# Patient Record
Sex: FEMALE | Race: AMERICAN INDIAN OR ALASKA NATIVE
[De-identification: names, ages, dates, MRNs, and addresses within clinical notes are randomized per-mention and may not be internally consistent; named-entity substitution may affect disease eponyms.]

---

## 2018-10-05 ENCOUNTER — HOSPITAL ENCOUNTER (INPATIENT)
Dept: HOSPITAL 5 - TRG | Age: 23
LOS: 3 days | Discharge: HOME | End: 2018-10-08
Attending: OBSTETRICS & GYNECOLOGY | Admitting: OBSTETRICS & GYNECOLOGY
Payer: MEDICAID

## 2018-10-05 DIAGNOSIS — E66.01: ICD-10-CM

## 2018-10-05 DIAGNOSIS — Z3A.39: ICD-10-CM

## 2018-10-05 DIAGNOSIS — D64.9: ICD-10-CM

## 2018-10-05 DIAGNOSIS — Z79.899: ICD-10-CM

## 2018-10-05 LAB
ALBUMIN SERPL-MCNC: 3.3 G/DL (ref 3.9–5)
ALT SERPL-CCNC: 18 UNITS/L (ref 7–56)
BILIRUB UR QL STRIP: (no result)
BLOOD UR QL VISUAL: (no result)
BUN SERPL-MCNC: 6 MG/DL (ref 7–17)
BUN/CREAT SERPL: 15 %
CALCIUM SERPL-MCNC: 8.8 MG/DL (ref 8.4–10.2)
HCT VFR BLD CALC: 32.2 % (ref 30.3–42.9)
HEMOLYSIS INDEX: 10
HGB BLD-MCNC: 10.8 GM/DL (ref 10.1–14.3)
MCH RBC QN AUTO: 26 PG (ref 28–32)
MCHC RBC AUTO-ENTMCNC: 34 % (ref 30–34)
MCV RBC AUTO: 77 FL (ref 79–97)
MUCOUS THREADS #/AREA URNS HPF: (no result) /HPF
PH UR STRIP: 6 [PH] (ref 5–7)
PLATELET # BLD: 314 K/MM3 (ref 140–440)
RBC # BLD AUTO: 4.16 M/MM3 (ref 3.65–5.03)
RBC #/AREA URNS HPF: 4 /HPF (ref 0–6)
UROBILINOGEN UR-MCNC: < 2 MG/DL (ref ?–2)
WBC #/AREA URNS HPF: 8 /HPF (ref 0–6)

## 2018-10-05 PROCEDURE — 85014 HEMATOCRIT: CPT

## 2018-10-05 PROCEDURE — 90715 TDAP VACCINE 7 YRS/> IM: CPT

## 2018-10-05 PROCEDURE — 86592 SYPHILIS TEST NON-TREP QUAL: CPT

## 2018-10-05 PROCEDURE — A6250 SKIN SEAL PROTECT MOISTURIZR: HCPCS

## 2018-10-05 PROCEDURE — 80053 COMPREHEN METABOLIC PANEL: CPT

## 2018-10-05 PROCEDURE — 76815 OB US LIMITED FETUS(S): CPT

## 2018-10-05 PROCEDURE — 36415 COLL VENOUS BLD VENIPUNCTURE: CPT

## 2018-10-05 PROCEDURE — 87086 URINE CULTURE/COLONY COUNT: CPT

## 2018-10-05 PROCEDURE — 81001 URINALYSIS AUTO W/SCOPE: CPT

## 2018-10-05 PROCEDURE — 86901 BLOOD TYPING SEROLOGIC RH(D): CPT

## 2018-10-05 PROCEDURE — 76819 FETAL BIOPHYS PROFIL W/O NST: CPT

## 2018-10-05 PROCEDURE — 85027 COMPLETE CBC AUTOMATED: CPT

## 2018-10-05 PROCEDURE — 86900 BLOOD TYPING SEROLOGIC ABO: CPT

## 2018-10-05 PROCEDURE — 90471 IMMUNIZATION ADMIN: CPT

## 2018-10-05 PROCEDURE — 86850 RBC ANTIBODY SCREEN: CPT

## 2018-10-05 PROCEDURE — 59200 INSERT CERVICAL DILATOR: CPT

## 2018-10-05 PROCEDURE — 85018 HEMOGLOBIN: CPT

## 2018-10-05 RX ADMIN — SODIUM CHLORIDE, SODIUM LACTATE, POTASSIUM CHLORIDE, AND CALCIUM CHLORIDE SCH MLS/HR: .6; .31; .03; .02 INJECTION, SOLUTION INTRAVENOUS at 20:28

## 2018-10-05 NOTE — ULTRASOUND REPORT
FINAL REPORT



PROCEDURE:  Limited obstetrical ultrasound. 



TECHNIQUE:  Real-time limited sonographic examination was

performed for evaluation of fetal size, position, heartbeat,

fluid volume  for each fetus with image documentation (1 or more

fetuses). CPT 08256



HISTORY:  Pregnancy, evaluate amniotic fluid index. 



COMPARISON:  No prior studies are available for comparison.



FINDINGS:  

There is a single viable fetus. Cardiac activity is demonstrated

at 135 beats per minute. The amniotic fluid index measures 8.6

centimeters. 



IMPRESSION:  

8.6 centimeter amniotic fluid index.

## 2018-10-05 NOTE — ULTRASOUND REPORT
FINAL REPORT



PROCEDURE:  Ultrasound fetal biophysical profile without

nonstress test. 



TECHNIQUE:  Sonographic evaluation for fetal breathing, fetal

movement, fetal tone, and amniotic fluid volume was performed.

CPT 43347



HISTORY:  Pregnancy, evaluate biophysical profile. 



COMPARISON:  No prior studies are available for comparison.



FINDINGS:  

Amniotic fluid volume: 2.



Fetal breathin.



Fetal movement: 2.



Fetal tone: 2.



Score: 8 of 8.



IMPRESSION:  

Normal biophysical profile.

## 2018-10-05 NOTE — HISTORY AND PHYSICAL REPORT
History of Present Illness


Date of examination: 10/05/18


Date of admission: 


10/05/18 19:26





Chief complaint: 


Here for induction of labor due to class 3 obesity.





History of present illness: 


23 year old  presents to L&D for induction of labor due to obesity. 


Patient has received prenatal care at [facility] and co-

managed with [facility].  


LMP 18. EDC 10/11/18 (based on LMP and confirmed by 12 week US). EGA 39 

weeks, 1 day gestation. 


Pregnancy significant for obesity, UTI (treated with Macrobid), Vitamin D 

deficiency (supplemented with Vitamin D), and marginal cord insertion.


Prenatal labs are as follows: A+, antibody screen negative, rubella immune, RPR 

nonreactive, hepatitis B surface antigen negative, HIV negative, hemoglobin 

electrophoresis normal, chlamydia negative, gonorrhea negative, HSV 2 negative, 

GBS negative.








Past History


Past Medical History: other (obesity)


Past Surgical History: other (elective )


GYN History: denies: cancer, chlamydia, fibroids, gonorrhea, hepatitis B, 

hepatitis C, herpes, HIV, syphilis


Family/Genetic History: none


Social history: single, lives with family, full code.  denies: smoking, alcohol 

abuse, prescription drug abuse, IV drug use





- Obstetrical History


Expected Date of Delivery: 10/11/18


Actual Gestation: 39 Week(s) 1 Day(s) 


: 2


Para: 0


Hx # Term Pregnancies: 0


Number of  Pregnancies: 0


Spontaneous Abortions: 0


Induced : 1


Number of Living Children: 0





Medications and Allergies


 Allergies











Allergy/AdvReac Type Severity Reaction Status Date / Time


 


No Known Allergies Allergy   Unverified 10/05/18 16:40











 Home Medications











 Medication  Instructions  Recorded  Confirmed  Last Taken  Type


 


Pnv No.121/Iron/Folic Acid 1 tab PO QDAY 10/05/18 10/05/18 2 Days Ago History





[Prenatal Multivitamin Tablet]    ~10/03/18 











Active Meds: 


Active Medications





Dinoprostone (Cervidil)  10 mg VG ONCE ONE


   Stop: 10/05/18 21:18


Ephedrine Sulfate (Ephedrine Sulfate)  10 mg IV Q2M PRN


   PRN Reason: Hypotension


Lactated Ringer's (Lactated Ringers)  1,000 mls @ 125 mls/hr IV AS DIRECT SAMARA


Oxytocin/Sodium Chloride (Pitocin/Ns 20 Unit/1000ml Drip)  20 units in 1,000 

mls @ 125 mls/hr IV AS DIRECT SAMARA


Lidocaine (Xylocaine 2%)  20 ml INFILTRATI ONCE ONE


   Stop: 10/05/18 20:18


Terbutaline Sulfate (Brethine)  0.25 mg SUB-Q ONCE PRN


   PRN Reason: Hyperstimulation/Hypertonicity











Review of Systems


All systems: negative (induction of labor for obesity)





- Vital Signs


Vital signs: 


 Vital Signs











Temp Pulse Resp BP


 


 97.8 F   82   18   132/76 


 


 10/05/18 17:55  10/05/18 17:55  10/05/18 17:55  10/05/18 17:55








 











Temp Pulse Resp BP Pulse Ox


 


 98.3 F   87   20   137/76   97 


 


 10/05/18 19:23  10/05/18 19:25  10/05/18 19:23  10/05/18 19:24  10/05/18 19:25














- Physical Exam


Cardiovascular: Regular rate, Normal S1, Normal S2


Lungs: Positive: Clear to auscultation


Abdomen: Positive: normal appearance, soft.  Negative: distention, tenderness, 

guarding, rigidity


Genitourinary (Female): Positive: normal external genitalia, normal perenium, 

other (Several small condyloma noted bilaterally on vulva; no ulcers, no 

blisters noted on careful exam with bright light upon admission)


Vagina: Positive: normal moisture


Uterus: Positive: enlarged.  Negative: tender


Anus/Rectum: Positive: normal perianal skin


Extremities: Positive: normal





- Obstetrical


FHR: category 2


Uterine Contraction Monitor Mode: External


Cervical Dilatation: 1


Cervical Effacement Percentage: 70


Fetal station: -2


Uterine Contraction Pattern: Absent





Results


All other labs normal.








Assessment and Plan


A: Pregnancy at 39 weeks, 1 day gestation. 


Obesity. 


GBS negative. 


P: Admit. 


Cervidil cervical ripening, followed by Pitocin induction of labor. 


Discussed with patient risks and benefits of Cervidil cervical ripening and 

Pitocin induction of labor. Patient consented to Cervidil cervical ripening and 

Pitocin induction of labor.

## 2018-10-06 PROCEDURE — 3E0R3BZ INTRODUCTION OF ANESTHETIC AGENT INTO SPINAL CANAL, PERCUTANEOUS APPROACH: ICD-10-PCS | Performed by: OBSTETRICS & GYNECOLOGY

## 2018-10-06 PROCEDURE — 3E033VJ INTRODUCTION OF OTHER HORMONE INTO PERIPHERAL VEIN, PERCUTANEOUS APPROACH: ICD-10-PCS | Performed by: OBSTETRICS & GYNECOLOGY

## 2018-10-06 PROCEDURE — 00HU33Z INSERTION OF INFUSION DEVICE INTO SPINAL CANAL, PERCUTANEOUS APPROACH: ICD-10-PCS | Performed by: OBSTETRICS & GYNECOLOGY

## 2018-10-06 PROCEDURE — 10907ZC DRAINAGE OF AMNIOTIC FLUID, THERAPEUTIC FROM PRODUCTS OF CONCEPTION, VIA NATURAL OR ARTIFICIAL OPENING: ICD-10-PCS | Performed by: OBSTETRICS & GYNECOLOGY

## 2018-10-06 PROCEDURE — 3E0P7VZ INTRODUCTION OF HORMONE INTO FEMALE REPRODUCTIVE, VIA NATURAL OR ARTIFICIAL OPENING: ICD-10-PCS | Performed by: OBSTETRICS & GYNECOLOGY

## 2018-10-06 RX ADMIN — SODIUM CHLORIDE, SODIUM LACTATE, POTASSIUM CHLORIDE, AND CALCIUM CHLORIDE SCH MLS/HR: .6; .31; .03; .02 INJECTION, SOLUTION INTRAVENOUS at 04:08

## 2018-10-06 RX ADMIN — DOCUSATE SODIUM SCH MG: 100 CAPSULE, LIQUID FILLED ORAL at 21:07

## 2018-10-06 RX ADMIN — IBUPROFEN SCH MG: 600 TABLET, FILM COATED ORAL at 21:07

## 2018-10-06 RX ADMIN — SODIUM CHLORIDE, SODIUM LACTATE, POTASSIUM CHLORIDE, AND CALCIUM CHLORIDE SCH MLS/HR: .6; .31; .03; .02 INJECTION, SOLUTION INTRAVENOUS at 11:45

## 2018-10-06 RX ADMIN — SODIUM CHLORIDE, SODIUM LACTATE, POTASSIUM CHLORIDE, AND CALCIUM CHLORIDE SCH MLS/HR: .6; .31; .03; .02 INJECTION, SOLUTION INTRAVENOUS at 13:50

## 2018-10-06 NOTE — EVENT NOTE
Date: 10/06/18


Patient received Cervidil for cervical ripening overnight. Pt. is being induced 

at 39 weeks, 2 days gestation due to morbid obesity. Cervidil has been removed 

and cervix is 3 cm dilated per RN. Pitocin orders put in; plan to start Pitocin 

at 11:30 AM. Continuous EFM.

## 2018-10-06 NOTE — PROCEDURE NOTE
OB Delivery Note





- Delivery


Date of Delivery: 10/06/18


Surgeon: LARA LUNA


Assistant: NORA VICENTE


Estimated blood loss: other (250 cc)





- Vaginal


Delivery presentation: vertex


Delivery position: OA


Intrapartum events: mult.variable deceleratio


Delivery induction: AROM


Delivery monitor: external FHT, external uterine


Route of delivery: vacuum extraction


Indicators for instrumentation: nonreassuring FHR tracing


Delivery placenta: spontaneous


Delivery cord: 3 umbilical vessels


Episiotomy: none


Delivery laceration: none


Anesthesia: epidural


Delivery comments: 


Vacuum assisted vaginal delivery performed by Dr. ABHI Luna of liveborn male 

infant weighing 7 lb. 15 oz. over intact perineum with apgars of 8/9. Patient 

had urge to push but was not pushing effectively. FHR tracing showed deep 

variable FHR decelerations. Dr. Luna came to patient's room to expedite 

delivery with [place]. NICU called to delivery to evaluate baby. Baby placed on 

maternal chest after delivery while 3 vessel cord was double clamped and cut. 

Baby was then taken to radiant warmer to be evaluated by NICU team. Spontaneous 

delivery of intact placenta and membranes by burgess mechanism. Pitocin to IV 

fluids after delivery of placenta.  cc. Fundus firm and midline. No 

lacerations noted. Vaginal sweep negative. Sponge count correct. Sharps removed 

from table.

## 2018-10-07 LAB
HCT VFR BLD CALC: 26.7 % (ref 30.3–42.9)
HGB BLD-MCNC: 9 GM/DL (ref 10.1–14.3)

## 2018-10-07 RX ADMIN — IBUPROFEN SCH MG: 600 TABLET, FILM COATED ORAL at 12:53

## 2018-10-07 RX ADMIN — FERROUS SULFATE TAB 325 MG (65 MG ELEMENTAL FE) SCH MG: 325 (65 FE) TAB at 22:28

## 2018-10-07 RX ADMIN — FERROUS SULFATE TAB 325 MG (65 MG ELEMENTAL FE) SCH MG: 325 (65 FE) TAB at 18:33

## 2018-10-07 RX ADMIN — IBUPROFEN SCH MG: 600 TABLET, FILM COATED ORAL at 05:24

## 2018-10-07 RX ADMIN — IBUPROFEN SCH: 600 TABLET, FILM COATED ORAL at 21:06

## 2018-10-07 RX ADMIN — DOCUSATE SODIUM SCH MG: 100 CAPSULE, LIQUID FILLED ORAL at 22:28

## 2018-10-07 RX ADMIN — DOCUSATE SODIUM SCH MG: 100 CAPSULE, LIQUID FILLED ORAL at 10:43

## 2018-10-07 RX ADMIN — IBUPROFEN SCH MG: 600 TABLET, FILM COATED ORAL at 18:21

## 2018-10-07 NOTE — PROGRESS NOTE
Assessment and Plan


A: Postpartum day 1 S/P VAVD.


Anemia. 


P: Supplement with iron. 











Subjective





- Subjective


Date of service: 10/07/18


Principal diagnosis: Postpartum day 1 S/P VAVD


Interval history: 


Postpartum day 1 S/P VAVD.


Doing well. Patient reports a small amount of lochia.


Patient is voiding without difficulty. she is ambulating well and tolerating a 

regular diet. 


Patient denies headache, cough, shortness of breath, chest pain, abdominal pain

, leg pain, nausea or vomiting, heavy bleeding, or any other problems. 





Patient reports: appetite normal, voiding normally, pain well controlled, flatus

, ambulating normally


: doing well





Objective





- Vital Signs


Latest vital signs: 


 Vital Signs











  Temp Pulse Resp BP BP Pulse Ox


 


 10/07/18 12:53    18   


 


 10/07/18 08:33   86     96


 


 10/07/18 08:32  97.8 F  84  18  125/56   95


 


 10/07/18 06:20    18   


 


 10/07/18 05:24    18   


 


 10/07/18 00:30  98.7 F  69  16   107/72 


 


 10/06/18 22:07    18   


 


 10/06/18 21:07    18   


 


 10/06/18 19:30  98.6 F  72  16   104/78 


 


 10/06/18 19:03  98.2 F  84  20  126/55   100


 


 10/06/18 18:55  98.4 F  82  20   126/56  100


 


 10/06/18 17:46   98 H   131/59  


 


 10/06/18 17:31   93 H   120/56  


 


 10/06/18 17:16   100 H   136/71  


 


 10/06/18 17:12   97 H     96


 


 10/06/18 17:10   93 H     92


 


 10/06/18 17:06   95 H     99


 


 10/06/18 17:01   93 H     98


 


 10/06/18 16:19   89     82 L


 


 10/06/18 16:18   107 H     97


 


 10/06/18 16:13   88     100


 


 10/06/18 16:08   85     100


 


 10/06/18 16:03   85     99


 


 10/06/18 15:58   73     100


 


 10/06/18 15:55   76   127/61  


 


 10/06/18 15:53   78     99


 


 10/06/18 15:52   90     78 L


 


 10/06/18 15:48   80     100


 


 10/06/18 15:43   80     100


 


 10/06/18 15:38   88     98


 


 10/06/18 15:33   89     99


 


 10/06/18 15:28   83     100


 


 10/06/18 15:26   90   109/56  


 


 10/06/18 15:23   97 H     99


 


 10/06/18 15:18   90     100


 


 10/06/18 15:13   89     100


 


 10/06/18 15:10   85   133/60  


 


 10/06/18 15:08   91 H     100


 


 10/06/18 15:03   96 H     100


 


 10/06/18 14:58   98 H     99


 


 10/06/18 14:53   86     100


 


 10/06/18 14:48   106 H   138/62  


 


 10/06/18 14:47   120 H     95


 


 10/06/18 14:46   91 H   135/62  


 


 10/06/18 14:44   95 H     72 L


 


 10/06/18 14:43   84   135/63  


 


 10/06/18 14:41   84     100


 


 10/06/18 14:39   82   131/59  


 


 10/06/18 14:36       57 L


 


 10/06/18 14:35   89     84


 


 10/06/18 14:30   54 L     58 L








 Intake and Output











 10/06/18 10/07/18 10/07/18





 23:59 07:59 15:59


 


Intake Total 740 440 


 


Output Total 500  


 


Balance 240 440 


 


Intake:   


 


  Oral 200 200 


 


  Intake, Free Water 540 240 


 


Output:   


 


  Urine 500  


 


    Void 500  


 


Other:   


 


  Total, Intake Amount 200 200 


 


  Total, Output Amount 500  


 


  # Voids   


 


    Void 1  


 


  Estimated Blood Loss 250  














- Exam


Cardiovascular: Present: Regular rate, Normal S1, Normal S2


Lungs: Present: Clear to auscultation


Abdomen: Present: normal appearance, soft, normal bowel sounds.  Absent: 

distention, tenderness, guarding, rigidity


Uterus: Present: normal, firm, fundal height below umbilicus.  Absent: bogginess

, tenderness


Extremities: Present: normal.  Absent: tenderness, edema





- Labs


Labs: 


 Abnormal lab results











  10/07/18 Range/Units





  05:41 


 


Hgb  9.0 L  (10.1-14.3)  gm/dl


 


Hct  26.7 L  (30.3-42.9)  %

## 2018-10-08 VITALS — DIASTOLIC BLOOD PRESSURE: 70 MMHG | SYSTOLIC BLOOD PRESSURE: 106 MMHG

## 2018-10-08 LAB
HCT VFR BLD CALC: 28.1 % (ref 30.3–42.9)
HGB BLD-MCNC: 9.2 GM/DL (ref 10.1–14.3)
MCH RBC QN AUTO: 26 PG (ref 28–32)
MCHC RBC AUTO-ENTMCNC: 33 % (ref 30–34)
MCV RBC AUTO: 78 FL (ref 79–97)
PLATELET # BLD: 269 K/MM3 (ref 140–440)
RBC # BLD AUTO: 3.58 M/MM3 (ref 3.65–5.03)

## 2018-10-08 RX ADMIN — IBUPROFEN SCH MG: 600 TABLET, FILM COATED ORAL at 16:21

## 2018-10-08 RX ADMIN — IBUPROFEN SCH: 600 TABLET, FILM COATED ORAL at 02:57

## 2018-10-08 RX ADMIN — IBUPROFEN SCH MG: 600 TABLET, FILM COATED ORAL at 09:13

## 2018-10-08 RX ADMIN — DOCUSATE SODIUM SCH MG: 100 CAPSULE, LIQUID FILLED ORAL at 09:14

## 2018-10-08 NOTE — DISCHARGE SUMMARY
Providers





- Providers


Date of Admission: 


10/05/18 19:26





Date of discharge: 10/08/18


Attending physician: 


KALPESH MCLAIN MD





Primary care physician: 


KALPESH MCLAIN MD








Hospitalization


Reason for admission: induction of labor, IUP at term


Delivery: vacuum extraction


Episiotomy: none


Laceration: none


Other postpartum procedures: none


Discharge diagnosis: IUP at term delivered


[place] baby: male


Hospital course: 





Uncomplicated


Condition at discharge: Stable


Disposition: DC-01 TO HOME OR SELFCARE





- Discharge Diagnoses


(1) Status post vacuum-assisted vaginal delivery


Status: Acute   





(2) Anemia in puerperium, baby delivered during current episode of care


Status: Acute   Comment: Asymptomatic


Continue iron therapy   





Plan





- Discharge Medications


Prescriptions: 


Ferrous Sulfate [Feosol 325 MG tab] 325 mg PO BID #60 tablet





- Provider Discharge Summary


Activity: routine, no sex for 6 weeks, no heavy lifting 4 weeks, no strenuous 

exercise


Diet: routine


Instructions: routine


Additional instructions: 


[]  Smoking cessation referral if applicable(refer to patient education folder 

for contact #)


[]  Refer to [facility]'s [facility] Booklet








Call your doctor immediately for:


* Fever > 100.5


* Heavy vaginal bleeding ( >1 pad per hour)


* Severe persistent headache


* Shortness of breath


* Reddened, hot, painful area to leg or breast


* Drainage or odor from incision.





* Keep incision clean and dry at all times and follow doctor's instructions 

regarding bathing/showering











- Follow up plan


Follow up: 


KALPESH MCLAIN MD [Primary Care Provider] - 6 Weeks (Follow up at [facility] in 6 weeks for postpartum exam/Nexplanon insertion)

## 2018-10-08 NOTE — PROGRESS NOTE
Assessment and Plan





- Patient Problems


(1) Status post vacuum-assisted vaginal delivery


Current Visit: Yes   Status: Acute   


Plan to address problem: 


PPD 2 - stable


Continue routine PP orders


Discharge to home later today


Follow up at [facility] in 6 weeks for postpartum exam








(2) Anemia in puerperium, baby delivered during current episode of care


Current Visit: Yes   Status: Acute   


Plan to address problem: 


Asymptomatic


Continue iron therapy








Subjective





- Subjective


Date of service: 10/08/18


Principal diagnosis: PPD #2; s/p VAVD


Patient reports: appetite normal, voiding normally, pain well controlled, 

ambulating normally, no dizzy ambulation


: doing well, nursing well





Objective





- Vital Signs


Latest vital signs: 


 Vital Signs











  Temp Pulse Resp BP BP Pulse Ox


 


 10/08/18 09:13    20   


 


 10/08/18 08:41  97.9 F  80   128/61   83 L


 


 10/08/18 00:00  98.7 F  66  18   108/68 


 


 10/07/18 18:21    18   


 


 10/07/18 16:05   96 H     100


 


 10/07/18 16:04  98.4 F  95 H  18  126/53   100


 


 10/07/18 12:53    18   


 


 10/07/18 11:54  98.4 F  89  18  104/46   100








 Intake and Output











 10/07/18 10/08/18 10/08/18





 23:59 07:59 15:59


 


Intake Total 640 200 


 


Output Total 3  


 


Balance 637 200 


 


Intake:   


 


  Oral 640 200 


 


Output:   


 


  Urine 3  


 


    Void 3  


 


Other:   


 


  Total, Intake Amount 640 200 


 


  Total, Output Amount 3  


 


  # Voids   


 


    Void 3  














- Exam


Abdomen: Present: normal appearance, soft


Vulva: both: normal


Uterus: Present: normal, firm, fundal height at umbilicus


Comments: 





scant lochia





- Labs


Labs: 


 Abnormal lab results











  10/08/18 Range/Units





  06:40 


 


WBC  14.7 H  (4.5-11.0)  K/mm3


 


RBC  3.58 L  (3.65-5.03)  M/mm3


 


Hgb  9.2 L  (10.1-14.3)  gm/dl


 


Hct  28.1 L  (30.3-42.9)  %


 


MCV  78 L  (79-97)  fl


 


MCH  26 L  (28-32)  pg


 


RDW  15.4 H  (13.2-15.2)  %